# Patient Record
Sex: MALE | Race: WHITE | NOT HISPANIC OR LATINO | URBAN - METROPOLITAN AREA
[De-identification: names, ages, dates, MRNs, and addresses within clinical notes are randomized per-mention and may not be internally consistent; named-entity substitution may affect disease eponyms.]

---

## 2024-10-25 ENCOUNTER — EMERGENCY (EMERGENCY)
Facility: HOSPITAL | Age: 34
LOS: 1 days | Discharge: HOME | End: 2024-10-25
Attending: EMERGENCY MEDICINE | Admitting: EMERGENCY MEDICINE
Payer: COMMERCIAL

## 2024-10-25 VITALS
TEMPERATURE: 98 F | HEART RATE: 67 BPM | OXYGEN SATURATION: 99 % | RESPIRATION RATE: 18 BRPM | DIASTOLIC BLOOD PRESSURE: 84 MMHG | SYSTOLIC BLOOD PRESSURE: 134 MMHG

## 2024-10-25 VITALS
DIASTOLIC BLOOD PRESSURE: 81 MMHG | RESPIRATION RATE: 18 BRPM | OXYGEN SATURATION: 98 % | SYSTOLIC BLOOD PRESSURE: 133 MMHG | TEMPERATURE: 98 F | HEART RATE: 77 BPM

## 2024-10-25 PROCEDURE — 99284 EMERGENCY DEPT VISIT MOD MDM: CPT | Mod: 25

## 2024-10-25 PROCEDURE — 73030 X-RAY EXAM OF SHOULDER: CPT | Mod: 26

## 2024-10-25 PROCEDURE — 73000 X-RAY EXAM OF COLLAR BONE: CPT

## 2024-10-25 PROCEDURE — 73030 X-RAY EXAM OF SHOULDER: CPT

## 2024-10-25 PROCEDURE — 99284 EMERGENCY DEPT VISIT MOD MDM: CPT

## 2024-10-25 PROCEDURE — 73000 X-RAY EXAM OF COLLAR BONE: CPT | Mod: 26,LT
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
## 2024-10-25 NOTE — ED PROVIDER NOTE - IV ALTEPLASE DOOR HIDDEN
[FreeTextEntry1] : He is here with escort from group home named [facility] who provides information. \par \par This is a 61 year old male with T2DM, HTN, HLD, osteopenia, vitamin D insufficiency, OCD, anemia, neurogenic bladder, elevated creatinine, elevated LFT's, mild MR, paraphilia, esotropia, here for follow-up.  \par He is on metformin 500mg 2 x day.  Check hemoglobin A1c.\par Check vitamin B12 as he is on metformin.\par Last ophthalmologist exam was within one year and Rolo denies retinopathy. \par No known nephropathy.  Check urine microalbumin.\par No known neuropathy. \par He is on simvastatin 5 mg daily.  Check lipid panel.\par He is not on an ACE inhibitor or ARB.  BP is normal.\par 
show
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
## 2024-10-25 NOTE — ED ADULT NURSE NOTE - CAS DISCH CONDITION
PT A&Ox4, respirations even and unlabored, skin color WDL warm and dry, pt is ambulatory with a steady gait. No acute distress observed.  Pt  with sling./Improved

## 2024-10-25 NOTE — ED PROVIDER NOTE - NSFOLLOWUPINSTRUCTIONS_ED_ALL_ED_FT
Acromioclavicular Separation  rest, ice, use sling, you can take ibuprofen as needed, please follow up with orthopedics within a wk  Body outline showing the skeleton, with a close-up of the acromion, clavicle, and acromioclavicular joint in the shoulder.  A shoulder separation (acromioclavicular separation) is an injury to the ligaments between the top of the shoulder blade (acromion) and the collarbone (clavicle). Ligaments are tissues that connect bones to each other.  In this injury, the ligaments may be stretched, partially torn, or completely torn.  A stretched ligament may not cause much pain, and it does not move the collarbone out of place. A stretched ligament looks normal on an X-ray.  A partial tear causes an injury that is a bit worse, and it may move the collarbone slightly out of place.  A complete tear causes serious injury. The surrounding shoulder ligaments are completely torn. This moves the collarbone out of position and creates a bad shape (deformity) of the shoulder.  What are the causes?  Common causes of this condition include:  Falling on the shoulder.  Receiving a hard, direct hit to the top of the shoulder.  Falling on an outstretched arm.  What increases the risk?  You may be at greater risk of a shoulder separation if you:  Are male.  Are younger than 35 years of age.  Play a contact sport, such as football or hockey.  What are the signs or symptoms?  The most common symptom of this condition is pain on the top of the shoulder after falling on it or receiving a hard, direct hit to it. Other symptoms include:  A change in the shape of the shoulder (deformity).  Swelling of the shoulder.  Decreased ability to move the shoulder.  Bruising on top of the shoulder.  How is this diagnosed?  Your health care provider may suspect a shoulder separation based on your symptoms and the details of a recent injury you experienced. The condition will be diagnosed based on:  A physical exam. Your provider may:  Press on your shoulder.  Test the movement of your shoulder.  Ask you to hold a weight in your hand to see if the separation increases.  Imaging tests, such as:  X-rays.  MRI.  How is this treated?  Treatment for this condition depends on the cause and severity of the injury.  A shoulder separation caused by a stretched ligament may require 2–12 weeks of the following:  Wearing a sling.  Taking medicines to help relieve pain.  Applying cold packs to your shoulder.  Physical therapy. If needed, a physical therapist will teach you to do daily exercises to improve strength and prevent stiffness in your shoulder.  Surgery may be needed for severe injuries that include breaks (fractures) in a bone, or injuries that do not get better with nonsurgical treatments. To help with healing, you will need to keep your joint in place for a period of time (immobilization) and do physical therapy.  Follow these instructions at home:  Medicines  Take over-the-counter and prescription medicines only as told by your health care provider.  Ask your health care provider if the medicine prescribed to you:  Requires you to avoid driving or using machinery.  Can cause constipation. You may need to take these actions to prevent or treat constipation:  Drink enough fluid to keep your urine pale yellow.  Take over-the-counter or prescription medicines.  Eat foods that are high in fiber, such as beans, whole grains, and fresh fruits and vegetables.  Limit foods that are high in fat and processed sugars, such as fried or sweet foods.  If you have a sling:  Wear the sling as told by your health care provider. Remove it only as told by your health care provider.  Check the skin around the sling every day. Tell your health care provider about any concerns.  Loosen the sling if your fingers tingle, become numb, or turn cold and blue.  Keep the sling clean.  If the sling is not waterproof:  Do not let it get wet.  Cover it with a watertight covering when you take a bath or shower.  Managing pain, stiffness, and swelling  Bag of ice on a towel on the skin.  If directed, put ice on the top of your shoulder. To do this:  Put ice in a plastic bag.  Place a towel between your skin and the bag.  Leave the ice on for 20 minutes, 2–3 times a day.  If your skin turns bright red, remove the ice right away to prevent skin damage. The risk of skin damage is higher if you cannot feel pain, heat, or cold.  Do not do any activities that make your pain worse.  Activity  You may have to avoid lifting. Ask your health care provider how much you can safely lift.  Rest your shoulder. Avoid activities that take a lot of effort for as long as told by your health care provider.  Return to your normal activities as told by your health care provider. Ask your health care provider what activities are safe for you.  Do range-of-motion exercises as told by your health care provider.  General instructions  Do not use any products that contain nicotine or tobacco. These products include cigarettes, chewing tobacco, and vaping devices, such as e-cigarettes. These can delay healing. If you need help quitting, ask your health care provider.  Keep all follow-up visits. Your health care provider will monitor how your injury is healing and adjust your activities. These may include visits for physical therapy.  Contact a health care provider if:  Pain medicine is not relieving your pain.  Your pain and stiffness are not improving after 2 weeks.  You are not able to do your physical therapy exercises because of pain or stiffness.  Get help right away if:  Your arm on the injured side feels cold or numb.  Your skin or fingers on the arm on the injured side turn blue or gray.  Summary  A shoulder separation (acromioclavicular separation) is an injury to the ligaments between the top of the shoulder blade (acromion) and the collarbone (clavicle).  The ligaments may be stretched, partially torn, or completely torn.  Common causes of a shoulder separation include falling on or receiving a hard, direct hit to the top of the shoulder. Falling with an outstretched arm may also cause this injury.  Rest your shoulder. Avoid activities that take a lot of effort for as long as told by your health care provider.

## 2024-10-25 NOTE — ED PROVIDER NOTE - PATIENT PORTAL LINK FT
You can access the FollowMyHealth Patient Portal offered by [facility] by registering at the following website: [email or web address]. By joining [facility]’s FollowMyHealth portal, you will also be able to view your health information using other applications (apps) compatible with our system.

## 2024-10-25 NOTE — ED PROVIDER NOTE - MUSCULOSKELETAL, MLM
Spine appears normal, range of motion is not limited, no muscle or joint tenderness; L shoulder: + step off over distal clavicle, no direct bony tend over shoulder joint, no scapula tend, full passive ROM of shoulder joint,  + light touch over deltoid, no elbow or wrist tend, radial pulse 2+, no U/M/R nerve deficits, muscle strength 5/5, good resistance

## 2024-10-28 DIAGNOSIS — S49.92XA UNSPECIFIED INJURY OF LEFT SHOULDER AND UPPER ARM, INITIAL ENCOUNTER: ICD-10-CM
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
